# Patient Record
(demographics unavailable — no encounter records)

---

## 2024-10-16 NOTE — PROCEDURE
[FreeTextEntry3] : Bilateral breast ultrasound  The patient has a history for bilateral breast cyst  Four-quadrant survey the right breast demonstrates no suspicious findings  Four-quadrant survey the left breast demonstrates no suspicious findings  BI-RADS 2

## 2024-10-16 NOTE — HISTORY OF PRESENT ILLNESS
[FreeTextEntry1] : The patient is referred for evaluation of bilateral breast cyst Scattered microcalcifications reported in both breast  1 year follow-up was suggested by the radiologist  She denies palpable mass, pain, skin thickening  She has no first-degree relatives with breast cancer

## 2024-10-16 NOTE — ASSESSMENT
[FreeTextEntry1] : Scattered microcalcifications noted both breast  No developing mass clinically or on ultrasound  Patient reassured  Asked to follow-up in 6 months  A total of 30 minutes was spent in consultation